# Patient Record
Sex: MALE | Race: WHITE | NOT HISPANIC OR LATINO | Employment: UNEMPLOYED | URBAN - METROPOLITAN AREA
[De-identification: names, ages, dates, MRNs, and addresses within clinical notes are randomized per-mention and may not be internally consistent; named-entity substitution may affect disease eponyms.]

---

## 2023-06-19 ENCOUNTER — OFFICE VISIT (OUTPATIENT)
Dept: OPTOMETRY | Facility: CLINIC | Age: 53
End: 2023-06-19
Payer: MEDICARE

## 2023-06-19 DIAGNOSIS — H10.13 ALLERGIC CONJUNCTIVITIS OF BOTH EYES: ICD-10-CM

## 2023-06-19 DIAGNOSIS — H52.13 MYOPIA OF BOTH EYES: ICD-10-CM

## 2023-06-19 DIAGNOSIS — E11.9 TYPE 2 DIABETES MELLITUS WITHOUT COMPLICATION, WITHOUT LONG-TERM CURRENT USE OF INSULIN (H): Primary | ICD-10-CM

## 2023-06-19 DIAGNOSIS — H52.223 REGULAR ASTIGMATISM OF BOTH EYES: ICD-10-CM

## 2023-06-19 PROCEDURE — 92004 COMPRE OPH EXAM NEW PT 1/>: CPT | Performed by: OPTOMETRIST

## 2023-06-19 PROCEDURE — 92015 DETERMINE REFRACTIVE STATE: CPT | Performed by: OPTOMETRIST

## 2023-06-19 RX ORDER — AZELASTINE HYDROCHLORIDE 0.5 MG/ML
1 SOLUTION/ DROPS OPHTHALMIC 2 TIMES DAILY PRN
Qty: 6 ML | Refills: 11 | Status: SHIPPED | OUTPATIENT
Start: 2023-06-19 | End: 2024-06-18

## 2023-06-19 ASSESSMENT — VISUAL ACUITY
OS_CC+: -1
OD_CC: 20/25
CORRECTION_TYPE: GLASSES
OS_CC: 20/40-2
OS_CC: 20/20
OS_SC+: -2
OS_SC: 20/60
OD_CC: 20/50
OD_SC: 20/150
METHOD: SNELLEN - LINEAR
OD_CC+: -1

## 2023-06-19 ASSESSMENT — REFRACTION_MANIFEST
OS_AXIS: 016
OD_CYLINDER: +1.75
OS_SPHERE: -1.75
OD_AXIS: 160
METHOD_AUTOREFRACTION: 1
OD_ADD: +2.00
OS_CYLINDER: +1.25
OD_SPHERE: -2.50
OS_ADD: +2.00

## 2023-06-19 ASSESSMENT — REFRACTION_WEARINGRX
OD_CYLINDER: +1.75
OS_CYLINDER: +1.25
OD_SPHERE: -2.25
OD_AXIS: 160
OS_AXIS: 010
SPECS_TYPE: PAL
OS_SPHERE: -1.75
OS_ADD: +1.25
OD_ADD: +1.25

## 2023-06-19 ASSESSMENT — KERATOMETRY
OD_AXISANGLE_DEGREES: 147
OS_AXISANGLE2_DEGREES: 123
OS_AXISANGLE_DEGREES: 033
OD_K1POWER_DIOPTERS: 44.75
OD_K2POWER_DIOPTERS: 46.25
OS_K1POWER_DIOPTERS: 44.50
OD_AXISANGLE2_DEGREES: 057
OS_K2POWER_DIOPTERS: 45.50

## 2023-06-19 ASSESSMENT — CONF VISUAL FIELD
OS_SUPERIOR_TEMPORAL_RESTRICTION: 0
OS_INFERIOR_NASAL_RESTRICTION: 0
OS_NORMAL: 1
OD_NORMAL: 1
OS_INFERIOR_TEMPORAL_RESTRICTION: 0
OD_INFERIOR_TEMPORAL_RESTRICTION: 0
OD_INFERIOR_NASAL_RESTRICTION: 0
OS_SUPERIOR_NASAL_RESTRICTION: 0
OD_SUPERIOR_NASAL_RESTRICTION: 0
OD_SUPERIOR_TEMPORAL_RESTRICTION: 0

## 2023-06-19 ASSESSMENT — CUP TO DISC RATIO
OS_RATIO: 0.2
OD_RATIO: 0.3

## 2023-06-19 ASSESSMENT — TONOMETRY
IOP_METHOD: TONOPEN
OD_IOP_MMHG: 18
OS_IOP_MMHG: 15

## 2023-06-19 ASSESSMENT — SLIT LAMP EXAM - LIDS
COMMENTS: NORMAL
COMMENTS: NORMAL

## 2023-06-19 ASSESSMENT — EXTERNAL EXAM - LEFT EYE: OS_EXAM: NORMAL

## 2023-06-19 ASSESSMENT — EXTERNAL EXAM - RIGHT EYE: OD_EXAM: NORMAL

## 2023-06-19 NOTE — LETTER
6/19/2023         RE: Phill CUNNINGHAM Skye  [address]  [facility] [address]        Dear Colleague,    Thank you for referring your patient, Phill Cheung, to the [facility]. Please see a copy of my visit note below.    Chief Complaint   Patient presents with     Diabetic Eye Exam        Chief Complaint(s) and History of Present Illness(es)     Diabetic Eye Exam            Vision: is stable    Diabetes Type: Type 2 and taking oral medications    Duration: 1 year    Blood Sugars: fluctuates                A1C  7.6 11/8/2021- [facility]     Last Eye Exam: 6 years  Dilated Previously: Yes    What are you currently using to see?  glasses    Distance Vision Acuity: Noticed gradual change in both eyes    Near Vision Acuity: Not satisfied     Eye Comfort: good  Do you use eye drops? : No  Occupation or Hobbies: none    Isela Yi Optometric Assistant, A.B.O.C.     Medical, surgical and family histories reviewed and updated 6/19/2023.       OBJECTIVE: See Ophthalmology exam    ASSESSMENT:    ICD-10-CM    1. Type 2 diabetes mellitus without complication, without long-term current use of insulin (H)  E11.9 EYE EXAM (SIMPLE-NONBILLABLE)    Negative diabetic retinopathy both eyes      2. Allergic conjunctivitis of both eyes  H10.13 EYE EXAM (SIMPLE-NONBILLABLE)     azelastine (OPTIVAR) 0.05 % ophthalmic solution      3. Myopia of both eyes  H52.13 REFRACTION      4. Regular astigmatism of both eyes  H52.223 REFRACTION          PLAN:    Phill Cheung aware  eye exam results will be sent to Teri Noel  Patient Instructions   There are not any signs of the diabetes affecting the eyes today.  It is important that you get your eyes dilated once yearly and keep good control of your diabetes.    Optivar- 1 drop both eyes 2 x day as needed for itchy eyes.    Recommend new glasses.    Return in 1 year for a complete eye exam or sooner if needed.    Charles Palma, MIK                  Again, thank you for allowing me to participate in the care of your patient.        Sincerely,        Charles Palma OD

## 2023-06-19 NOTE — PATIENT INSTRUCTIONS
There are not any signs of the diabetes affecting the eyes today.  It is important that you get your eyes dilated once yearly and keep good control of your diabetes.    Optivar- 1 drop both eyes 2 x day as needed for itchy eyes.    Recommend new glasses.    Return in 1 year for a complete eye exam or sooner if needed.    Charles Palma, OD    The affects of the dilating drops last for 4- 6 hours.  You will be more sensitive to light and vision will be blurry up close.  Do not drive if you do not feel comfortable.  Mydriatic sunglasses were given if needed.    Patient Education   Diabetes weakens the blood vessels all over the body, including the eyes. Damage to the blood vessels in the eyes can cause swelling or bleeding into part of the eye (called the retina). This is called diabetic retinopathy (EVAN-tin--pu-thee). If not treated, this disease can cause vision loss or blindness.   Symptoms may include blurred or distorted vision, but many people have no symptoms. It's important to see your eye doctor regularly to check for problems.   Early treatment and good control can help protect your vision. Here are the things you can do to help prevent vision loss:      1. Keep your blood sugar levels under tight control.      2. Bring high blood pressure under control.      3. No smoking.      4. Have yearly dilated eye exams.         Optometry Providers       Clinic Locations                                 Telephone Number   Dr. Xochitl Vega   [place]  [place] [place]  [place] [phone]     [place] [facility] Hours:                [place] [facility] Hours:       [facility] Hours:   [address] [facility] [address]   [address]     [address]  [place] [address]   [place] [address]    W[place] Vega [address]  Phone: [phone]                    Phone: [phone]      Phone: [phone]                      Monday 8:00-6:00                          Monday 8:00-6:00                          Monday 8:00-6:00              Tuesday 8:00-6:00                          Tuesday 8:00-6:00                          Tuesday 8:00-6:00              Wednesday 8:00-6:00                  Wednesday 8:00-6:00                   Wednesday 8:00-6:00      Thursday 8:00-6:00                        Thursday 8:00-6:00                         Thursday 8:00-6:00            Friday 8:00-5:00                              Friday 8:00-5:00                              Friday 8:00-5:00    [facility] Hours:   [address] [address]  [phone]    Monday 9:00-6:00  Tuesday 9:00-6:00  Wednesday 9:00-6:00  Thursday 9:00-6:00  Friday 9:00-5:00  As always, Thank you for trusting us with your health care needs!

## 2023-06-19 NOTE — PROGRESS NOTES
Chief Complaint   Patient presents with     Diabetic Eye Exam        Chief Complaint(s) and History of Present Illness(es)     Diabetic Eye Exam            Vision: is stable    Diabetes Type: Type 2 and taking oral medications    Duration: 1 year    Blood Sugars: fluctuates                A1C  7.6 11/8/2021- [facility]     Last Eye Exam: 6 years  Dilated Previously: Yes    What are you currently using to see?  glasses    Distance Vision Acuity: Noticed gradual change in both eyes    Near Vision Acuity: Not satisfied     Eye Comfort: good  Do you use eye drops? : No  Occupation or Hobbies: none    Isela Yi Optometric Assistant, A.B.O.C.     Medical, surgical and family histories reviewed and updated 6/19/2023.       OBJECTIVE: See Ophthalmology exam    ASSESSMENT:    ICD-10-CM    1. Type 2 diabetes mellitus without complication, without long-term current use of insulin (H)  E11.9 EYE EXAM (SIMPLE-NONBILLABLE)    Negative diabetic retinopathy both eyes      2. Allergic conjunctivitis of both eyes  H10.13 EYE EXAM (SIMPLE-NONBILLABLE)     azelastine (OPTIVAR) 0.05 % ophthalmic solution      3. Myopia of both eyes  H52.13 REFRACTION      4. Regular astigmatism of both eyes  H52.223 REFRACTION          PLAN:    Phill Cheung aware  eye exam results will be sent to Teri Noel  Patient Instructions   There are not any signs of the diabetes affecting the eyes today.  It is important that you get your eyes dilated once yearly and keep good control of your diabetes.    Optivar- 1 drop both eyes 2 x day as needed for itchy eyes.    Recommend new glasses.    Return in 1 year for a complete eye exam or sooner if needed.    Charles Palma, MIK

## 2023-07-06 ENCOUNTER — APPOINTMENT (OUTPATIENT)
Dept: OPTOMETRY | Facility: CLINIC | Age: 53
End: 2023-07-06
Payer: MEDICARE

## 2023-07-06 PROCEDURE — 92341 FIT SPECTACLES BIFOCAL: CPT | Performed by: OPTOMETRIST

## 2024-04-01 PROBLEM — E11.69 TYPE 2 DIABETES MELLITUS WITH OBESITY (H): Status: ACTIVE | Noted: 2017-05-08

## 2024-04-01 PROBLEM — E66.9 TYPE 2 DIABETES MELLITUS WITH OBESITY (H): Status: ACTIVE | Noted: 2017-05-08
